# Patient Record
Sex: FEMALE | Race: WHITE | Employment: STUDENT | URBAN - METROPOLITAN AREA
[De-identification: names, ages, dates, MRNs, and addresses within clinical notes are randomized per-mention and may not be internally consistent; named-entity substitution may affect disease eponyms.]

---
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
## 2025-01-30 ENCOUNTER — OFFICE VISIT (OUTPATIENT)
Dept: FAMILY MEDICINE CLINIC | Age: 20
End: 2025-01-30

## 2025-01-30 VITALS
HEIGHT: 67 IN | SYSTOLIC BLOOD PRESSURE: 118 MMHG | BODY MASS INDEX: 25.11 KG/M2 | OXYGEN SATURATION: 97 % | WEIGHT: 160 LBS | DIASTOLIC BLOOD PRESSURE: 64 MMHG | HEART RATE: 68 BPM

## 2025-01-30 DIAGNOSIS — Z00.00 ANNUAL PHYSICAL EXAM: Primary | ICD-10-CM

## 2025-01-30 DIAGNOSIS — Z30.9 ENCOUNTER FOR CONTRACEPTIVE MANAGEMENT, UNSPECIFIED TYPE: ICD-10-CM

## 2025-01-30 RX ORDER — DESOGESTREL AND ETHINYL ESTRADIOL 0.15-0.03
1 KIT ORAL DAILY
Qty: 28 TABLET | Refills: 11 | Status: SHIPPED | OUTPATIENT
Start: 2025-01-30

## 2025-01-30 SDOH — ECONOMIC STABILITY: FOOD INSECURITY: WITHIN THE PAST 12 MONTHS, YOU WORRIED THAT YOUR FOOD WOULD RUN OUT BEFORE YOU GOT MONEY TO BUY MORE.: NEVER TRUE

## 2025-01-30 SDOH — ECONOMIC STABILITY: FOOD INSECURITY: WITHIN THE PAST 12 MONTHS, THE FOOD YOU BOUGHT JUST DIDN'T LAST AND YOU DIDN'T HAVE MONEY TO GET MORE.: NEVER TRUE

## 2025-01-30 ASSESSMENT — PATIENT HEALTH QUESTIONNAIRE - PHQ9
SUM OF ALL RESPONSES TO PHQ QUESTIONS 1-9: 0
SUM OF ALL RESPONSES TO PHQ9 QUESTIONS 1 & 2: 0
SUM OF ALL RESPONSES TO PHQ QUESTIONS 1-9: 0
1. LITTLE INTEREST OR PLEASURE IN DOING THINGS: NOT AT ALL
2. FEELING DOWN, DEPRESSED OR HOPELESS: NOT AT ALL
SUM OF ALL RESPONSES TO PHQ QUESTIONS 1-9: 0
SUM OF ALL RESPONSES TO PHQ QUESTIONS 1-9: 0

## 2025-01-30 NOTE — PROGRESS NOTES
Chief Complaint:   Dayton Tam is a 19 y.o. female who presents for complete physical examination.    History of Present Illness:    Has been on depo, wants to switch to oral contraceptive pills. Last depo given 11/6/24. Tolerated oral contraceptive pills in the past.   Declines STI screening today.      Past Medical History:   Diagnosis Date    Keratosis pilaris     Neurocardiogenic syncope 1/31/2020       No past surgical history on file.    Outpatient Medications Marked as Taking for the 1/30/25 encounter (Office Visit) with Paulina Batista MD   Medication Sig Dispense Refill    desogestrel-ethinyl estradiol (ISIBLOOM) 0.15-30 MG-MCG per tablet Take 1 tablet by mouth daily 28 tablet 11    Adapalene-Benzoyl Peroxide 0.1-2.5 % GEL Apply daily 45 g 0    albuterol sulfate HFA (PROVENTIL;VENTOLIN;PROAIR) 108 (90 Base) MCG/ACT inhaler INHALE 2 PUFFS INTO THE LUNGS USING INHALER EVERY 4 HOURS AS NEEDED       No Known Allergies    Social History     Socioeconomic History    Marital status: Single   Tobacco Use    Smoking status: Never    Smokeless tobacco: Never   Vaping Use    Vaping status: Never Used   Substance and Sexual Activity    Alcohol use: No    Drug use: No     Social Determinants of Health     Financial Resource Strain: Low Risk  (7/29/2021)    Overall Financial Resource Strain (CARDIA)     Difficulty of Paying Living Expenses: Not hard at all   Food Insecurity: No Food Insecurity (1/30/2025)    Hunger Vital Sign     Worried About Running Out of Food in the Last Year: Never true     Ran Out of Food in the Last Year: Never true   Transportation Needs: No Transportation Needs (1/30/2025)    PRAPARE - Transportation     Lack of Transportation (Medical): No     Lack of Transportation (Non-Medical): No   Intimate Partner Violence: Low Risk  (12/6/2019)    [facility]    Intimate Partner Violence

## 2025-01-31 DIAGNOSIS — Z30.9 ENCOUNTER FOR CONTRACEPTIVE MANAGEMENT, UNSPECIFIED TYPE: ICD-10-CM

## 2025-01-31 NOTE — TELEPHONE ENCOUNTER
Medication:   Requested Prescriptions     Pending Prescriptions Disp Refills    ISIBLOOM 0.15-30 MG-MCG per tablet [Pharmacy Med Name: ISIBLOOM 0.15MG-0.03MG TABLETS 28S] 84 tablet      Sig: TAKE 1 TABLET BY MOUTH DAILY        Last Filled:  1/30/2025    Patient Phone Number: [phone] (home)     Last appt: 1/30/2025   Next appt: Visit date not found    Last OARRS:        No data to display

## 2025-02-03 DIAGNOSIS — N92.6 ABNORMAL BLEEDING IN MENSTRUAL CYCLE: ICD-10-CM

## 2025-02-03 RX ORDER — MEDROXYPROGESTERONE ACETATE 150 MG/ML
INJECTION, SUSPENSION INTRAMUSCULAR
Qty: 1 ML | OUTPATIENT
Start: 2025-02-03

## 2025-02-03 RX ORDER — DESOGESTREL AND ETHINYL ESTRADIOL 0.15-0.03
1 KIT ORAL DAILY
Qty: 84 TABLET | Refills: 3 | Status: SHIPPED | OUTPATIENT
Start: 2025-02-03

## 2025-02-03 NOTE — TELEPHONE ENCOUNTER
Medication:   Requested Prescriptions     Pending Prescriptions Disp Refills    medroxyPROGESTERone (DEPO-PROVERA) 150 MG/ML injection [Pharmacy Med Name: MEDROXYPROGESTERONE 150MG/ML PF SYR] 1 mL      Sig: ADMINISTER 1 ML IN THE MUSCLE 1 TIME FOR 1 DOSE        Last Filled: 11/6/2024    Patient Phone Number: [phone] (home)     Last appt: 1/30/2025   Next appt: Visit date not found    Last OARRS:        No data to display

## 2025-02-24 ENCOUNTER — TELEPHONE (OUTPATIENT)
Dept: FAMILY MEDICINE CLINIC | Age: 20
End: 2025-02-24

## 2025-02-24 NOTE — TELEPHONE ENCOUNTER
Patient requesting refill of...  desogestrel-ethinyl estradiol (ISIBLOOM) 0.15-30 MG-MCG per tablet [[id number]]    Order Details  Dose: 1 tablet Route: Oral Frequency: DAILY   Dispense Quantity: 84 tablet Refills: 3    Note to Pharmacy: **Patient requests 90 days supply**         Sig: TAKE 1 TABLET BY MOUTH DAILY     Pt is going out of town on Wed. Pharmacy will not release it early for her refills.    Last visit date: 1/30/2025    Pharmacy...  [id number] - [facility] [address] - P [phone] - F [phone]

## 2025-02-24 NOTE — TELEPHONE ENCOUNTER
Prescription was written for 90 day supply on 2/3/25    If she only got 30d supply last time, ok to fill early due to travel. Please call pharmacy and give them verbal authorization to fill early

## 2025-02-25 NOTE — TELEPHONE ENCOUNTER
Pharmacist (Sabas) confirmed desogestrel-ethinyl estradiol (ISIBLOOM) 0.15-30 MG-MCG per tablet is ready for p/u at no cost.     LMOM advising patient that desogestrel-ethinyl estradiol (ISIBLOOM) 0.15-30 MG-MCG per tablet is ready for p/u now.